# Patient Record
Sex: FEMALE | Race: OTHER | HISPANIC OR LATINO
[De-identification: names, ages, dates, MRNs, and addresses within clinical notes are randomized per-mention and may not be internally consistent; named-entity substitution may affect disease eponyms.]

---

## 2023-08-23 PROBLEM — Z00.00 ENCOUNTER FOR PREVENTIVE HEALTH EXAMINATION: Status: ACTIVE | Noted: 2023-08-23

## 2023-08-27 ENCOUNTER — NON-APPOINTMENT (OUTPATIENT)
Age: 18
End: 2023-08-27

## 2023-08-30 ENCOUNTER — APPOINTMENT (OUTPATIENT)
Dept: NEUROLOGY | Facility: CLINIC | Age: 18
End: 2023-08-30
Payer: MEDICAID

## 2023-08-30 ENCOUNTER — TRANSCRIPTION ENCOUNTER (OUTPATIENT)
Age: 18
End: 2023-08-30

## 2023-08-30 ENCOUNTER — NON-APPOINTMENT (OUTPATIENT)
Age: 18
End: 2023-08-30

## 2023-08-30 PROCEDURE — 95816 EEG AWAKE AND DROWSY: CPT

## 2024-02-25 ENCOUNTER — INPATIENT (INPATIENT)
Facility: HOSPITAL | Age: 19
LOS: 1 days | Discharge: HOME | DRG: 392 | End: 2024-02-27
Attending: STUDENT IN AN ORGANIZED HEALTH CARE EDUCATION/TRAINING PROGRAM | Admitting: INTERNAL MEDICINE
Payer: MEDICAID

## 2024-02-25 VITALS
WEIGHT: 134.92 LBS | RESPIRATION RATE: 18 BRPM | TEMPERATURE: 99 F | OXYGEN SATURATION: 99 % | DIASTOLIC BLOOD PRESSURE: 76 MMHG | SYSTOLIC BLOOD PRESSURE: 109 MMHG | HEART RATE: 142 BPM

## 2024-02-25 DIAGNOSIS — D64.9 ANEMIA, UNSPECIFIED: ICD-10-CM

## 2024-02-25 LAB
ALBUMIN SERPL ELPH-MCNC: 3.1 G/DL — LOW (ref 3.3–5)
ALP SERPL-CCNC: 53 U/L — SIGNIFICANT CHANGE UP (ref 40–120)
ALT FLD-CCNC: 27 U/L — SIGNIFICANT CHANGE UP (ref 12–78)
ANION GAP SERPL CALC-SCNC: 9 MMOL/L — SIGNIFICANT CHANGE UP (ref 5–17)
APPEARANCE UR: CLEAR — SIGNIFICANT CHANGE UP
APTT BLD: 29.5 SEC — SIGNIFICANT CHANGE UP (ref 24.5–35.6)
AST SERPL-CCNC: 11 U/L — LOW (ref 15–37)
BACTERIA # UR AUTO: ABNORMAL /HPF
BASOPHILS # BLD AUTO: 0.01 K/UL — SIGNIFICANT CHANGE UP (ref 0–0.2)
BASOPHILS # BLD AUTO: 0.01 K/UL — SIGNIFICANT CHANGE UP (ref 0–0.2)
BASOPHILS NFR BLD AUTO: 0.1 % — SIGNIFICANT CHANGE UP (ref 0–2)
BASOPHILS NFR BLD AUTO: 0.1 % — SIGNIFICANT CHANGE UP (ref 0–2)
BILIRUB SERPL-MCNC: 0.9 MG/DL — SIGNIFICANT CHANGE UP (ref 0.2–1.2)
BILIRUB UR-MCNC: NEGATIVE — SIGNIFICANT CHANGE UP
BUN SERPL-MCNC: 4 MG/DL — LOW (ref 7–23)
CALCIUM SERPL-MCNC: 8.6 MG/DL — SIGNIFICANT CHANGE UP (ref 8.5–10.1)
CHLORIDE SERPL-SCNC: 109 MMOL/L — HIGH (ref 96–108)
CO2 SERPL-SCNC: 23 MMOL/L — SIGNIFICANT CHANGE UP (ref 22–31)
COLOR SPEC: YELLOW — SIGNIFICANT CHANGE UP
CREAT SERPL-MCNC: 0.61 MG/DL — SIGNIFICANT CHANGE UP (ref 0.5–1.3)
D DIMER BLD IA.RAPID-MCNC: <150 NG/ML DDU — SIGNIFICANT CHANGE UP
DIFF PNL FLD: NEGATIVE — SIGNIFICANT CHANGE UP
EGFR: 131 ML/MIN/1.73M2 — SIGNIFICANT CHANGE UP
EOSINOPHIL # BLD AUTO: 0 K/UL — SIGNIFICANT CHANGE UP (ref 0–0.5)
EOSINOPHIL # BLD AUTO: 0 K/UL — SIGNIFICANT CHANGE UP (ref 0–0.5)
EOSINOPHIL NFR BLD AUTO: 0 % — SIGNIFICANT CHANGE UP (ref 0–6)
EOSINOPHIL NFR BLD AUTO: 0 % — SIGNIFICANT CHANGE UP (ref 0–6)
EPI CELLS # UR: PRESENT
GLUCOSE SERPL-MCNC: 93 MG/DL — SIGNIFICANT CHANGE UP (ref 70–99)
GLUCOSE UR QL: NEGATIVE MG/DL — SIGNIFICANT CHANGE UP
HCG SERPL-ACNC: <1 MIU/ML — SIGNIFICANT CHANGE UP
HCT VFR BLD CALC: 28.4 % — LOW (ref 34.5–45)
HCT VFR BLD CALC: 31.4 % — LOW (ref 34.5–45)
HGB BLD-MCNC: 10.5 G/DL — LOW (ref 11.5–15.5)
HGB BLD-MCNC: 9.4 G/DL — LOW (ref 11.5–15.5)
IMM GRANULOCYTES NFR BLD AUTO: 0.5 % — SIGNIFICANT CHANGE UP (ref 0–0.9)
IMM GRANULOCYTES NFR BLD AUTO: 0.6 % — SIGNIFICANT CHANGE UP (ref 0–0.9)
INR BLD: 1.11 RATIO — SIGNIFICANT CHANGE UP (ref 0.85–1.18)
KETONES UR-MCNC: NEGATIVE MG/DL — SIGNIFICANT CHANGE UP
LEUKOCYTE ESTERASE UR-ACNC: NEGATIVE — SIGNIFICANT CHANGE UP
LYMPHOCYTES # BLD AUTO: 1.01 K/UL — SIGNIFICANT CHANGE UP (ref 1–3.3)
LYMPHOCYTES # BLD AUTO: 1.03 K/UL — SIGNIFICANT CHANGE UP (ref 1–3.3)
LYMPHOCYTES # BLD AUTO: 11 % — LOW (ref 13–44)
LYMPHOCYTES # BLD AUTO: 9.6 % — LOW (ref 13–44)
MAGNESIUM SERPL-MCNC: 1.6 MG/DL — SIGNIFICANT CHANGE UP (ref 1.6–2.6)
MCHC RBC-ENTMCNC: 29.7 PG — SIGNIFICANT CHANGE UP (ref 27–34)
MCHC RBC-ENTMCNC: 29.9 PG — SIGNIFICANT CHANGE UP (ref 27–34)
MCHC RBC-ENTMCNC: 33.1 GM/DL — SIGNIFICANT CHANGE UP (ref 32–36)
MCHC RBC-ENTMCNC: 33.4 GM/DL — SIGNIFICANT CHANGE UP (ref 32–36)
MCV RBC AUTO: 89.5 FL — SIGNIFICANT CHANGE UP (ref 80–100)
MCV RBC AUTO: 89.6 FL — SIGNIFICANT CHANGE UP (ref 80–100)
MONOCYTES # BLD AUTO: 0.49 K/UL — SIGNIFICANT CHANGE UP (ref 0–0.9)
MONOCYTES # BLD AUTO: 0.6 K/UL — SIGNIFICANT CHANGE UP (ref 0–0.9)
MONOCYTES NFR BLD AUTO: 5.3 % — SIGNIFICANT CHANGE UP (ref 2–14)
MONOCYTES NFR BLD AUTO: 5.6 % — SIGNIFICANT CHANGE UP (ref 2–14)
NEUTROPHILS # BLD AUTO: 7.64 K/UL — HIGH (ref 1.8–7.4)
NEUTROPHILS # BLD AUTO: 9.04 K/UL — HIGH (ref 1.8–7.4)
NEUTROPHILS NFR BLD AUTO: 83.1 % — HIGH (ref 43–77)
NEUTROPHILS NFR BLD AUTO: 84.1 % — HIGH (ref 43–77)
NITRITE UR-MCNC: NEGATIVE — SIGNIFICANT CHANGE UP
NRBC # BLD: 0 /100 WBCS — SIGNIFICANT CHANGE UP (ref 0–0)
NRBC # BLD: 0 /100 WBCS — SIGNIFICANT CHANGE UP (ref 0–0)
OB PNL STL: NEGATIVE — SIGNIFICANT CHANGE UP
PCP SPEC-MCNC: SIGNIFICANT CHANGE UP
PH UR: 6.5 — SIGNIFICANT CHANGE UP (ref 5–8)
PLATELET # BLD AUTO: 218 K/UL — SIGNIFICANT CHANGE UP (ref 150–400)
PLATELET # BLD AUTO: 229 K/UL — SIGNIFICANT CHANGE UP (ref 150–400)
POTASSIUM SERPL-MCNC: 3.3 MMOL/L — LOW (ref 3.5–5.3)
POTASSIUM SERPL-SCNC: 3.3 MMOL/L — LOW (ref 3.5–5.3)
PROT SERPL-MCNC: 6.7 G/DL — SIGNIFICANT CHANGE UP (ref 6–8.3)
PROT UR-MCNC: NEGATIVE MG/DL — SIGNIFICANT CHANGE UP
PROTHROM AB SERPL-ACNC: 12.9 SEC — SIGNIFICANT CHANGE UP (ref 9.5–13)
RAPID RVP RESULT: SIGNIFICANT CHANGE UP
RBC # BLD: 3.17 M/UL — LOW (ref 3.8–5.2)
RBC # BLD: 3.51 M/UL — LOW (ref 3.8–5.2)
RBC # FLD: 13.2 % — SIGNIFICANT CHANGE UP (ref 10.3–14.5)
RBC # FLD: 13.3 % — SIGNIFICANT CHANGE UP (ref 10.3–14.5)
RBC CASTS # UR COMP ASSIST: 1 /HPF — SIGNIFICANT CHANGE UP (ref 0–4)
SARS-COV-2 RNA SPEC QL NAA+PROBE: SIGNIFICANT CHANGE UP
SODIUM SERPL-SCNC: 141 MMOL/L — SIGNIFICANT CHANGE UP (ref 135–145)
SP GR SPEC: 1.03 — HIGH (ref 1–1.03)
TSH SERPL-MCNC: 0.46 UIU/ML — SIGNIFICANT CHANGE UP (ref 0.36–3.74)
UROBILINOGEN FLD QL: 1 MG/DL — SIGNIFICANT CHANGE UP (ref 0.2–1)
WBC # BLD: 10.74 K/UL — HIGH (ref 3.8–10.5)
WBC # BLD: 9.2 K/UL — SIGNIFICANT CHANGE UP (ref 3.8–10.5)
WBC # FLD AUTO: 10.74 K/UL — HIGH (ref 3.8–10.5)
WBC # FLD AUTO: 9.2 K/UL — SIGNIFICANT CHANGE UP (ref 3.8–10.5)
WBC UR QL: 2 /HPF — SIGNIFICANT CHANGE UP (ref 0–5)

## 2024-02-25 PROCEDURE — 93010 ELECTROCARDIOGRAM REPORT: CPT

## 2024-02-25 PROCEDURE — 99222 1ST HOSP IP/OBS MODERATE 55: CPT

## 2024-02-25 PROCEDURE — 74177 CT ABD & PELVIS W/CONTRAST: CPT | Mod: 26,MC

## 2024-02-25 PROCEDURE — 99285 EMERGENCY DEPT VISIT HI MDM: CPT

## 2024-02-25 RX ORDER — ACETAMINOPHEN 500 MG
1000 TABLET ORAL ONCE
Refills: 0 | Status: COMPLETED | OUTPATIENT
Start: 2024-02-25 | End: 2024-02-25

## 2024-02-25 RX ORDER — POTASSIUM CHLORIDE 20 MEQ
10 PACKET (EA) ORAL
Refills: 0 | Status: COMPLETED | OUTPATIENT
Start: 2024-02-25 | End: 2024-02-25

## 2024-02-25 RX ORDER — ONDANSETRON 8 MG/1
4 TABLET, FILM COATED ORAL ONCE
Refills: 0 | Status: COMPLETED | OUTPATIENT
Start: 2024-02-25 | End: 2024-02-25

## 2024-02-25 RX ORDER — ONDANSETRON 8 MG/1
4 TABLET, FILM COATED ORAL EVERY 8 HOURS
Refills: 0 | Status: DISCONTINUED | OUTPATIENT
Start: 2024-02-25 | End: 2024-02-27

## 2024-02-25 RX ORDER — SODIUM CHLORIDE 9 MG/ML
1000 INJECTION INTRAMUSCULAR; INTRAVENOUS; SUBCUTANEOUS ONCE
Refills: 0 | Status: COMPLETED | OUTPATIENT
Start: 2024-02-25 | End: 2024-02-25

## 2024-02-25 RX ORDER — POTASSIUM CHLORIDE 20 MEQ
20 PACKET (EA) ORAL ONCE
Refills: 0 | Status: COMPLETED | OUTPATIENT
Start: 2024-02-25 | End: 2024-02-25

## 2024-02-25 RX ORDER — SODIUM CHLORIDE 9 MG/ML
1000 INJECTION INTRAMUSCULAR; INTRAVENOUS; SUBCUTANEOUS
Refills: 0 | Status: COMPLETED | OUTPATIENT
Start: 2024-02-25 | End: 2024-02-26

## 2024-02-25 RX ORDER — ACETAMINOPHEN 500 MG
650 TABLET ORAL EVERY 6 HOURS
Refills: 0 | Status: DISCONTINUED | OUTPATIENT
Start: 2024-02-25 | End: 2024-02-27

## 2024-02-25 RX ORDER — FAMOTIDINE 10 MG/ML
20 INJECTION INTRAVENOUS ONCE
Refills: 0 | Status: COMPLETED | OUTPATIENT
Start: 2024-02-25 | End: 2024-02-25

## 2024-02-25 RX ORDER — PANTOPRAZOLE SODIUM 20 MG/1
40 TABLET, DELAYED RELEASE ORAL DAILY
Refills: 0 | Status: DISCONTINUED | OUTPATIENT
Start: 2024-02-25 | End: 2024-02-27

## 2024-02-25 RX ADMIN — PANTOPRAZOLE SODIUM 40 MILLIGRAM(S): 20 TABLET, DELAYED RELEASE ORAL at 21:05

## 2024-02-25 RX ADMIN — Medication 650 MILLIGRAM(S): at 20:00

## 2024-02-25 RX ADMIN — Medication 400 MILLIGRAM(S): at 22:52

## 2024-02-25 RX ADMIN — ONDANSETRON 4 MILLIGRAM(S): 8 TABLET, FILM COATED ORAL at 22:52

## 2024-02-25 RX ADMIN — Medication 100 MILLIEQUIVALENT(S): at 23:23

## 2024-02-25 RX ADMIN — Medication 100 MILLIEQUIVALENT(S): at 23:47

## 2024-02-25 RX ADMIN — ONDANSETRON 4 MILLIGRAM(S): 8 TABLET, FILM COATED ORAL at 17:11

## 2024-02-25 RX ADMIN — FAMOTIDINE 20 MILLIGRAM(S): 10 INJECTION INTRAVENOUS at 17:11

## 2024-02-25 RX ADMIN — SODIUM CHLORIDE 1000 MILLILITER(S): 9 INJECTION INTRAMUSCULAR; INTRAVENOUS; SUBCUTANEOUS at 19:49

## 2024-02-25 RX ADMIN — SODIUM CHLORIDE 1000 MILLILITER(S): 9 INJECTION INTRAMUSCULAR; INTRAVENOUS; SUBCUTANEOUS at 17:11

## 2024-02-25 RX ADMIN — Medication 100 MILLIEQUIVALENT(S): at 22:17

## 2024-02-25 RX ADMIN — SODIUM CHLORIDE 75 MILLILITER(S): 9 INJECTION INTRAMUSCULAR; INTRAVENOUS; SUBCUTANEOUS at 21:07

## 2024-02-25 RX ADMIN — Medication 20 MILLIEQUIVALENT(S): at 22:52

## 2024-02-25 RX ADMIN — Medication 650 MILLIGRAM(S): at 21:05

## 2024-02-25 RX ADMIN — Medication 1000 MILLIGRAM(S): at 23:19

## 2024-02-25 NOTE — ED PROVIDER NOTE - OBJECTIVE STATEMENT
17 yo female with PMHX anemia, not on medication, present to ED c/o abdominal pain, diarrhea, nausea, vomiting x 3-4 episodes (most recently prior to arrival). Per patient has had several pre-syncopal episodes over past two days. Mother has witness and patient became pale, felt weak and had muffled hearing during the episode. Patient is not currently menstruating, no sick contacts. + bloating, + chills.   Denies HA, fever, or vision change. Denies ear pain. Denies throat pain. Denies CP, palpitations, SOB or cough. ++ upper and lower abd pain, ++  n/v/d. Denies urinary symptoms, dysuria, frequency or urgency. 17 yo female with PMHX anemia, not on medication, present to ED c/o abdominal pain, diarrhea, nausea, vomiting x 3-4 episodes (most recently prior to arrival). Per patient has had several pre-syncopal episodes over past two days. Mother has witness and patient became pale, felt weak and had muffled hearing during the episode. Patient is not currently menstruating, no sick contacts. + bloating, + chills.   Denies HA, fever, or vision change. Denies ear pain. Denies throat pain. Denies CP, palpitations, SOB or cough. ++ upper and lower abd pain, ++  n/v/d. Denies urinary symptoms, dysuria, frequency or urgency. PCP Dominguez Odom.

## 2024-02-25 NOTE — ED ADULT NURSE NOTE - SUICIDE SCREENING DEPRESSION
Arterial Line  Performed by: Nathaly Fuentes M.D.  Authorized by: Nathaly Fuentes M.D.                Negative

## 2024-02-25 NOTE — ED PROVIDER NOTE - RESPIRATORY, MLM
Detail Level: Zone Patient had complaints of cysts in her groin area that have been pussing and draining. Patient states that she has been having trouble finding a gynecologist and would like a recommendation. Breath sounds clear and equal bilaterally.

## 2024-02-25 NOTE — H&P ADULT - NSHPPHYSICALEXAM_GEN_ALL_CORE
T(C): 37.1 (02-25-24 @ 15:48), Max: 37.1 (02-25-24 @ 15:48)  HR: 142 (02-25-24 @ 15:48) (142 - 142)  BP: 109/76 (02-25-24 @ 15:48) (109/76 - 109/76)  RR: 18 (02-25-24 @ 15:48) (18 - 18)  SpO2: 99% (02-25-24 @ 15:48) (99% - 99%)  Wt(kg): --    Physical Exam:   GENERAL: well-groomed, well-developed, NAD  HEENT: head NC/AT;  conjunctiva & sclera clear; hearing grossly intact, dry mucous membranes  NECK: supple, no JVD  RESPIRATORY: CTA B/L, no wheezing, rales, rhonchi or rubs  CARDIOVASCULAR: S1&S2, tachy, no murmurs or gallops  ABDOMEN: soft, non-tender, non-distended, + Bowel sounds x4 quadrants, no guarding, rebound or rigidity  MUSCULOSKELETAL:  no clubbing, cyanosis or edema of all 4 extremities  LYMPH: no cervical lymphadenopathy  VASCULAR: Radial pulses 2+ bilaterally, no varicose veins   SKIN: warm and dry, color normal  NEUROLOGIC: AA&O X3,MAEx4  Psych: Normal mood and affect, normal behavior

## 2024-02-25 NOTE — ED PROVIDER NOTE - CLINICAL SUMMARY MEDICAL DECISION MAKING FREE TEXT BOX
Patient is a 18-year-old female presents to the emergency room with a chief complaint of upper abdominal discomfort.  Past medical history of anemia not on any medication.  Reports that symptoms began yesterday she developed abdominal pain worse in the upper abdomen with associated nausea and 3-4 episodes of vomiting and multiple episodes of nonbloody diarrhea.  Also reports that she has had several presyncopal episodes over the last several days.  Prior to the episode she will become pale weak and her hearing becomes muffled.  Denies noting any black or bloody stool.  Is not currently menstruating.  Is reporting some chills and myalgias.  No headache at this time.  Denies any chest pain or shortness of breath feels like it is difficult to take a deep breath due to the upper abdominal discomfort.  On exam patient is lying in bed no acute distress normocephalic atraumatic pupils are equal round and reactive there is dry mucous membranes heart is regular with rapid rate lungs are clear to auscultation diminished at the bases abdomen soft with tenderness palpation the epigastric left upper quadrant left lower quadrant.  Positive bowel sounds noted.  Patient presenting to the emergency room with abdominal discomfort nausea vomiting diarrhea.  Differential includes but not limited to possible viral etiology versus colitis versus diverticulitis versus additional GI pathology.  Will obtain screening labs check D-dimer EKG hydrate obtain UA urine culture obtain CT imaging of the abdomen pelvis and monitor.  Independent review of EKG reveals a sinus tachycardia at 129 bpm.  CT imaging with no acute pathology.  Patient remains tachycardic with a slight downtrend in hemoglobin although guaiac is negative.  Urine drug screen and thyroid studies added will supplement potassium.  At this time will admit for further workup and management.  Pelvic ultrasound and venous Dopplers of the lower extremities were ordered as well medicine team to follow these up.

## 2024-02-25 NOTE — H&P ADULT - HISTORY OF PRESENT ILLNESS
Pt is a 19 yo F presenting with near syncope found to have anemia. PMh ADHD on Ritalin, anemia.     Patient states that since yesterday she has been having diarrhea with 2 episodes yesterday and 2 episodes today. Non bloody. She also has nausea and vomiting about 3 times without any blood. Subsequently today she felt light-headed and last night she felt like passing out but did not have LOC.   Her menses lasts 2-3 days and she only soaks through 3 tampons each day approx. She donated blood last week. Her abdominal pain is b/l lower quadrants.   Denies headaches,chest pain, SOB, palpitations,  constipation, diarrhea, melena, hematochezia, dysuria.   No recent travel or abx use.   Reports hx of anemia but does not know her baseline Hgb.

## 2024-02-25 NOTE — ED PROVIDER NOTE - ENMT NEGATIVE STATEMENT, MLM
Ears: no ear pain and++ muffles hearing during episode of pre-syncope. Nose: no nasal congestion and no nasal drainage. Mouth/Throat: no dysphagia, no hoarseness and no throat pain. Neck: no lumps, no pain, no stiffness and no swollen glands.

## 2024-02-25 NOTE — ED PROVIDER NOTE - PROGRESS NOTE DETAILS
pt continue to be tachycardic, rpt labs show decrease in H/H--will admit for symptomatic anemia and potential blood transfusion. Guiac pending.

## 2024-02-25 NOTE — ED ADULT NURSE NOTE - OBJECTIVE STATEMENT
pt sent from  for further evaluation. A&Ox4. c/o diffuse abd pain, dizziness. pt had 2 episodes diarrhea and 3 episodes emesis last night. today pt continues to feel nausea and dizziness. denies chest pain, sob, distress. denies fevers. no cardiac history. pt ekg completed. placed on monitor.

## 2024-02-25 NOTE — ED PROVIDER NOTE - ENMT, MLM
Airway patent, Nasal mucosa clear. Mouth with normal mucosa. Throat has no vesicles, no oropharyngeal exudates and uvula is midline. Pale mucous membranes.

## 2024-02-25 NOTE — H&P ADULT - ASSESSMENT
Pt is a 19 yo F presenting with near syncope found to have anemia. PMh ADHD on Ritalin, anemia.     Anemia: etiology unclear  -f/u pelvic ultrasound  -CT abd reviewed  -trend H/h  -FOBT negative, will repeat FOBT  -start IV protonix 40mg daily  -GI consulted  Dr Smith    Sinus tachycardia:likely due to dehydration and anemia  -continue IVF  -cardio consult Alvino Galvez  -f/u Utox  -replete potassium and recheck electrolytes    ADHD: can resume ritalin on discharge    Birth control mgmt: takes Junel. Patients family will bring and will be given to pharmacy for verification    DVT ppx: SCD's

## 2024-02-25 NOTE — ED ADULT NURSE NOTE - NSFALLUNIVINTERV_ED_ALL_ED
Bed/Stretcher in lowest position, wheels locked, appropriate side rails in place/Call bell, personal items and telephone in reach/Instruct patient to call for assistance before getting out of bed/chair/stretcher/Non-slip footwear applied when patient is off stretcher/[place] to call system/Physically safe environment - no spills, clutter or unnecessary equipment/Purposeful proactive rounding/Room/bathroom lighting operational, light cord in reach

## 2024-02-25 NOTE — ED PROVIDER NOTE - NEURO NEGATIVE STATEMENT, MLM
no loss of consciousness, ++ Pre-syncope ,  no gait abnormality, no headache, no sensory deficits, and ++weakness.

## 2024-02-25 NOTE — ED PROVIDER NOTE - WHICH SHOWED
Independent review of EKG reveals a sinus tachycardia at 129 bpm.  CT imaging with no acute pathology.

## 2024-02-26 ENCOUNTER — TRANSCRIPTION ENCOUNTER (OUTPATIENT)
Age: 19
End: 2024-02-26

## 2024-02-26 LAB
ALBUMIN SERPL ELPH-MCNC: 2.5 G/DL — LOW (ref 3.3–5)
ALP SERPL-CCNC: 49 U/L — SIGNIFICANT CHANGE UP (ref 40–120)
ALT FLD-CCNC: 56 U/L — SIGNIFICANT CHANGE UP (ref 12–78)
ANION GAP SERPL CALC-SCNC: 4 MMOL/L — LOW (ref 5–17)
AST SERPL-CCNC: 29 U/L — SIGNIFICANT CHANGE UP (ref 15–37)
BASOPHILS # BLD AUTO: 0.01 K/UL — SIGNIFICANT CHANGE UP (ref 0–0.2)
BASOPHILS NFR BLD AUTO: 0.2 % — SIGNIFICANT CHANGE UP (ref 0–2)
BILIRUB SERPL-MCNC: 0.4 MG/DL — SIGNIFICANT CHANGE UP (ref 0.2–1.2)
BUN SERPL-MCNC: 3 MG/DL — LOW (ref 7–23)
CALCIUM SERPL-MCNC: 8 MG/DL — LOW (ref 8.5–10.1)
CHLORIDE SERPL-SCNC: 120 MMOL/L — HIGH (ref 96–108)
CO2 SERPL-SCNC: 24 MMOL/L — SIGNIFICANT CHANGE UP (ref 22–31)
CREAT SERPL-MCNC: 0.45 MG/DL — LOW (ref 0.5–1.3)
CULTURE RESULTS: SIGNIFICANT CHANGE UP
EGFR: 141 ML/MIN/1.73M2 — SIGNIFICANT CHANGE UP
EOSINOPHIL # BLD AUTO: 0.05 K/UL — SIGNIFICANT CHANGE UP (ref 0–0.5)
EOSINOPHIL NFR BLD AUTO: 0.9 % — SIGNIFICANT CHANGE UP (ref 0–6)
GLUCOSE SERPL-MCNC: 83 MG/DL — SIGNIFICANT CHANGE UP (ref 70–99)
HCT VFR BLD CALC: 25.4 % — LOW (ref 34.5–45)
HCT VFR BLD CALC: 29.2 % — LOW (ref 34.5–45)
HGB BLD-MCNC: 8.5 G/DL — LOW (ref 11.5–15.5)
HGB BLD-MCNC: 9.5 G/DL — LOW (ref 11.5–15.5)
IMM GRANULOCYTES NFR BLD AUTO: 0.5 % — SIGNIFICANT CHANGE UP (ref 0–0.9)
LYMPHOCYTES # BLD AUTO: 1.18 K/UL — SIGNIFICANT CHANGE UP (ref 1–3.3)
LYMPHOCYTES # BLD AUTO: 20.8 % — SIGNIFICANT CHANGE UP (ref 13–44)
MAGNESIUM SERPL-MCNC: 1.4 MG/DL — LOW (ref 1.6–2.6)
MAGNESIUM SERPL-MCNC: 2.1 MG/DL — SIGNIFICANT CHANGE UP (ref 1.6–2.6)
MCHC RBC-ENTMCNC: 30 PG — SIGNIFICANT CHANGE UP (ref 27–34)
MCHC RBC-ENTMCNC: 32.5 GM/DL — SIGNIFICANT CHANGE UP (ref 32–36)
MCV RBC AUTO: 92.1 FL — SIGNIFICANT CHANGE UP (ref 80–100)
MONOCYTES # BLD AUTO: 0.66 K/UL — SIGNIFICANT CHANGE UP (ref 0–0.9)
MONOCYTES NFR BLD AUTO: 11.6 % — SIGNIFICANT CHANGE UP (ref 2–14)
NEUTROPHILS # BLD AUTO: 3.75 K/UL — SIGNIFICANT CHANGE UP (ref 1.8–7.4)
NEUTROPHILS NFR BLD AUTO: 66 % — SIGNIFICANT CHANGE UP (ref 43–77)
NRBC # BLD: 0 /100 WBCS — SIGNIFICANT CHANGE UP (ref 0–0)
OB PNL STL: NEGATIVE — SIGNIFICANT CHANGE UP
PLATELET # BLD AUTO: 204 K/UL — SIGNIFICANT CHANGE UP (ref 150–400)
POTASSIUM SERPL-MCNC: 3.5 MMOL/L — SIGNIFICANT CHANGE UP (ref 3.5–5.3)
POTASSIUM SERPL-MCNC: 3.8 MMOL/L — SIGNIFICANT CHANGE UP (ref 3.5–5.3)
POTASSIUM SERPL-SCNC: 3.5 MMOL/L — SIGNIFICANT CHANGE UP (ref 3.5–5.3)
POTASSIUM SERPL-SCNC: 3.8 MMOL/L — SIGNIFICANT CHANGE UP (ref 3.5–5.3)
PROT SERPL-MCNC: 5.7 G/DL — LOW (ref 6–8.3)
RBC # BLD: 3.17 M/UL — LOW (ref 3.8–5.2)
RBC # FLD: 13.6 % — SIGNIFICANT CHANGE UP (ref 10.3–14.5)
SODIUM SERPL-SCNC: 148 MMOL/L — HIGH (ref 135–145)
SPECIMEN SOURCE: SIGNIFICANT CHANGE UP
WBC # BLD: 5.68 K/UL — SIGNIFICANT CHANGE UP (ref 3.8–10.5)
WBC # FLD AUTO: 5.68 K/UL — SIGNIFICANT CHANGE UP (ref 3.8–10.5)

## 2024-02-26 PROCEDURE — 93970 EXTREMITY STUDY: CPT | Mod: 26

## 2024-02-26 PROCEDURE — 76830 TRANSVAGINAL US NON-OB: CPT | Mod: 26

## 2024-02-26 PROCEDURE — 70450 CT HEAD/BRAIN W/O DYE: CPT | Mod: 26

## 2024-02-26 PROCEDURE — 99232 SBSQ HOSP IP/OBS MODERATE 35: CPT

## 2024-02-26 RX ORDER — SODIUM CHLORIDE 9 MG/ML
1000 INJECTION, SOLUTION INTRAVENOUS
Refills: 0 | Status: DISCONTINUED | OUTPATIENT
Start: 2024-02-26 | End: 2024-02-27

## 2024-02-26 RX ORDER — MAGNESIUM SULFATE 500 MG/ML
1 VIAL (ML) INJECTION ONCE
Refills: 0 | Status: COMPLETED | OUTPATIENT
Start: 2024-02-26 | End: 2024-02-26

## 2024-02-26 RX ORDER — POTASSIUM CHLORIDE 20 MEQ
40 PACKET (EA) ORAL EVERY 4 HOURS
Refills: 0 | Status: COMPLETED | OUTPATIENT
Start: 2024-02-26 | End: 2024-02-26

## 2024-02-26 RX ADMIN — SODIUM CHLORIDE 75 MILLILITER(S): 9 INJECTION INTRAMUSCULAR; INTRAVENOUS; SUBCUTANEOUS at 06:37

## 2024-02-26 RX ADMIN — Medication 100 GRAM(S): at 06:33

## 2024-02-26 RX ADMIN — Medication 40 MILLIEQUIVALENT(S): at 07:06

## 2024-02-26 RX ADMIN — PANTOPRAZOLE SODIUM 40 MILLIGRAM(S): 20 TABLET, DELAYED RELEASE ORAL at 12:48

## 2024-02-26 RX ADMIN — Medication 650 MILLIGRAM(S): at 19:58

## 2024-02-26 RX ADMIN — Medication 650 MILLIGRAM(S): at 09:22

## 2024-02-26 RX ADMIN — Medication 650 MILLIGRAM(S): at 19:09

## 2024-02-26 RX ADMIN — SODIUM CHLORIDE 75 MILLILITER(S): 9 INJECTION, SOLUTION INTRAVENOUS at 12:48

## 2024-02-26 NOTE — PROVIDER CONTACT NOTE (OTHER) - ASSESSMENT
tachypnea, able to follow commands, neuro exam noted with leg tremors ; vital signs 120/76, , SPO2 98 on room air. Sinus tachy on telemetry monitor.

## 2024-02-26 NOTE — DISCHARGE NOTE PROVIDER - CARE PROVIDER_API CALL
PCP,   Phone: (   )    -  Fax: (   )    -  Follow Up Time:    PCP,   Phone: (   )    -  Fax: (   )    -  Follow Up Time:     Speedy Smith  Gastroenterology  [address] [place] [id number]  Phone: [phone]  Fax: [phone]  Follow Up Time: 2 weeks

## 2024-02-26 NOTE — CARE COORDINATION ASSESSMENT. - NSCAREPROVIDERS_GEN_ALL_CORE_FT
CARE PROVIDERS:  Accepting Physician: Paulino Pulido  Access Services: Finesse Arcos  Administration: Maged Juarez  Administration: Jt Oswald  Administration: Federica Spencer  Admitting: Paulino Pulido  Attending: Paulino Pulido  Consultant: Alvino Galvez  Consultant: Speedy Smith  ED ACP: Sherlyn Gary  ED Attending: Anette Garg  ED Nurse: Katalina Roblero  Nurse: Gloria Workman  Nurse: Najma Calle  Ordered: ADM, User  Ordered: Doctor, Unknown  Override: Johanna Hardy  Primary Team: Dorina Lombardo  Primary Team: Noris Funk  Primary Team: Renato Soto  Respiratory Therapy: Bhanu Hernadez  : Amanda Wallace  : Matthew Zendejas  Team: [facility], Team  UR// Supp. Assoc.: Sandra Umana

## 2024-02-26 NOTE — PATIENT PROFILE ADULT - FALL HARM RISK - HARM RISK INTERVENTIONS
Problem: Goal Outcome Summary  Goal: Goal Outcome Summary  Outcome: Completed Date Met:  04/27/17  Dr. Batista was aware of morning chest xray. Patient with no shortness of breath. Remains of room air. Ambulated in hallway twice this shift. Patient requested pain medication for back ache after activity and headache. Patient requested Oxycodone. Decline Tylenol. Patient states total relief of pain.       Communicate Risk of Fall with Harm to all staff/Reinforce activity limits and safety measures with patient and family/Tailored Fall Risk Interventions/Visual Cue: Yellow wristband and red socks/Bed in lowest position, wheels locked, appropriate side rails in place/Call bell, personal items and telephone in reach/Instruct patient to call for assistance before getting out of bed or chair/Non-slip footwear when patient is out of bed/[place] to call system/Physically safe environment - no spills, clutter or unnecessary equipment/Purposeful Proactive Rounding/Room/bathroom lighting operational, light cord in reach

## 2024-02-26 NOTE — CARE COORDINATION ASSESSMENT. - OTHER PERTINENT DISCHARGE PLANNING INFORMATION:
CM met with the patient at the bedside, educated pt on role of CM and transition planning. Patient verbalized understanding. CM provided direct contact/resource folder and remains available. Pt resides in a house with her parents and siblings, has 6 steps to enter and a flight to upstairs. Pt independent with ADLS and ambulating. Denies any DME or prior home care services. Pharmacy [address]

## 2024-02-26 NOTE — DISCHARGE NOTE PROVIDER - NSDCMRMEDTOKEN_GEN_ALL_CORE_FT
methylphenidate 10 mg oral tablet: 1 tab(s) orally once a day with 5 mg tablet (15 mg TDD)  methylphenidate 5 mg oral tablet: 1 tab(s) orally once a day with 10 mg tablet (15 mg TDD)  norethindrone-ethinyl estradiol 1.5 mg-30 mcg oral tablet: 1 tab(s) orally once a day   acetaminophen 325 mg oral tablet: 2 tab(s) orally every 6 hours As needed Temp greater or equal to 38C (100.4F), Mild Pain (1 - 3)  methylphenidate 10 mg oral tablet: 1 tab(s) orally once a day with 5 mg tablet (15 mg TDD)  methylphenidate 5 mg oral tablet: 1 tab(s) orally once a day with 10 mg tablet (15 mg TDD)  norethindrone-ethinyl estradiol 1.5 mg-30 mcg oral tablet: 1 tab(s) orally once a day

## 2024-02-26 NOTE — DISCHARGE NOTE PROVIDER - NSDCCPCAREPLAN_GEN_ALL_CORE_FT
PRINCIPAL DISCHARGE DIAGNOSIS  Diagnosis: Abdominal pain  Assessment and Plan of Treatment:       SECONDARY DISCHARGE DIAGNOSES  Diagnosis: Anemia  Assessment and Plan of Treatment: followup with your PCP for the workup of the anemia.     PRINCIPAL DISCHARGE DIAGNOSIS  Diagnosis: Abdominal pain  Assessment and Plan of Treatment: You were found to have norovirus infection.  Clinically you have improved with resolution of diarrhea and abdominal pain.  Please maintain adequate oral hydration.  Follow up with your PMD in 1 week and GI in 1-2 weeks.      SECONDARY DISCHARGE DIAGNOSES  Diagnosis: Anemia  Assessment and Plan of Treatment: followup with your PCP for the workup of the anemia.

## 2024-02-26 NOTE — DISCHARGE NOTE PROVIDER - PROVIDER TOKENS
FREE:[LAST:[PCP],PHONE:[(   )    -],FAX:[(   )    -]] FREE:[LAST:[PCP],PHONE:[(   )    -],FAX:[(   )    -]],PROVIDER:[TOKEN:[[id number]],FOLLOWUP:[2 weeks]]

## 2024-02-26 NOTE — DISCHARGE NOTE PROVIDER - ATTENDING DISCHARGE PHYSICAL EXAMINATION:
VITALS:   T(C): 36.5 (02-26-24 @ 09:47), Max: 37.1 (02-25-24 @ 15:48)  HR: 108 (02-26-24 @ 09:47) (102 - 142)  BP: 113/78 (02-26-24 @ 09:47) (96/53 - 113/78)  RR: 22 (02-26-24 @ 09:47) (18 - 34)  SpO2: 95% (02-26-24 @ 09:47) (95% - 99%)    GENERAL: NAD, lying in bed comfortably  HEAD:  Atraumatic, Normocephalic  EYES: EOMI, PERRLA, conjunctiva and sclera clear  ENT: Moist mucous membranes  NECK: Supple, No JVD  CHEST/LUNG: Clear to auscultation bilaterally; No rales, rhonchi, wheezing, or rubs. Unlabored respirations  HEART: Regular rate and rhythm; No murmurs, rubs, or gallops  ABDOMEN: BSx4; Soft, mild lower abd discomfort  EXTREMITIES:  2+ Peripheral Pulses, brisk capillary refill. No clubbing, cyanosis, or edema  NERVOUS SYSTEM:  A&Ox3, no focal deficits   SKIN: No rashes or lesions  PSYCH: Normal affect, euthymic mood VITALS: T: 97.9  P: 100  BP: 103/71  RR: 21  SpO2: 98% RA    GENERAL: NAD, lying in bed comfortably  HEAD:  Atraumatic, Normocephalic  EYES: EOMI, PERRLA, conjunctiva and sclera clear  ENT: Moist mucous membranes  NECK: Supple, No JVD  CHEST/LUNG: Clear to auscultation bilaterally; No rales, rhonchi, wheezing, or rubs. Unlabored respirations  HEART: Regular rate and rhythm; No murmurs, rubs, or gallops  ABDOMEN: BSx4; Soft, NT/ND  EXTREMITIES:  2+ Peripheral Pulses, brisk capillary refill. No clubbing, cyanosis, or edema  NERVOUS SYSTEM:  A&Ox3, no focal deficits   SKIN: No rashes or lesions  PSYCH: Normal affect, euthymic mood

## 2024-02-26 NOTE — ED ADULT NURSE REASSESSMENT NOTE - NS ED NURSE REASSESS COMMENT FT1
Pt received from night Rn. Pt A&Ox4 on rom air. Pt c/o migraine and moderate abdominal pain. Pt resting in stretcher.
pt home medication sent to pharmacy
Patient received from [facility].  Patient is presently receiving Potassium IV.

## 2024-02-26 NOTE — DISCHARGE NOTE PROVIDER - HOSPITAL COURSE
19yo F ADHD on Ritalin, anemia p/w diarrhea with 2 episodes yesterday and 2 episodes, noted some dizziness. Admitted for further care. Reports diarrhea resolved since admission and abd improved. Able to tolerate diet. Also noted with anemia, pt reports that she knows about the anemia diagnosis x 1 month. FOBT negative and denies heavy menses. Hg remains stable, okay for dc. 19yo F ADHD on Ritalin, anemia p/w diarrhea with 2 episodes yesterday and 2 episodes, noted some dizziness. Admitted for further care. Reports diarrhea resolved since admission and abd improved. Able to tolerate diet. Also noted with anemia, pt reports that she knows about the anemia diagnosis x 1 month. FOBT negative and denies heavy menses. Hg remains stable, stable for dc. Will need outpatient workup for anemia. 19yo F ADHD on Ritalin, anemia p/w diarrhea with 2 episodes yesterday and 2 episodes, noted some dizziness. Admitted for further care. Reports diarrhea resolved since admission and abd improved. Able to tolerate diet. Also noted with anemia, pt reports that she knows about the anemia diagnosis x 1 month. FOBT negative x 2 and denies heavy menses. Hg remains stable.  Will need outpatient workup for anemia.  Pt. had GI PCR positive for norovirus.  Clinically patient has improved.  Diarrhea resolved and patient without any abdominal pain.  Tolerating regular diet.  Transvaginal US with no evidence of ovarian torsion.  LE dopplers negative for DVT.      On day of discharge patient is in no distress.  Afebrile and hemodynamically stable.

## 2024-02-26 NOTE — CONSULT NOTE ADULT - ASSESSMENT
abdominal pain  nausea/vomiting  diarrhea    cont reg diet  suspected acute gastroenteritis  check GI pcr  observe off antibiotics  outpatient anemia workup  d/w patient
The patient is a 18 year old female with a history of ADHD who presents with abdominal pain.    Plan:  - ECG with sinus tachycardia  - Telemetry reviewed; there is sinus tachycardia but no evidence of other tachyarrhythmias  - HR had improved to low 100s  - Tachycardia likely multifactorial from pain, dehydration, anemia, anxiety  - Monitor hemoglobin - in part may be dilutional  - Abdominal imaging unrevealing  - No further cardiac testing indicated at this time

## 2024-02-26 NOTE — PHARMACOTHERAPY INTERVENTION NOTE - COMMENTS
Search Terms: Ivet Tadeo, 2005Search Date: 02/26/2024 10:20:35 AM  The Drug Utilization Report below displays all of the controlled substance prescriptions, if any, that your patient has filled in the last twelve months. The information displayed on this report is compiled from pharmacy submissions to the Department, and accurately reflects the information as submitted by the pharmacies.    This report was requested by: Melanie Jaime | Reference #: [id number]    Practitioner Count: 1  Pharmacy Count: 1  Current Opioid Prescriptions: 0  Current Benzodiazepine Prescriptions: 0  Current Stimulant Prescriptions: 2      Patient Demographic Information (PDI)       PDI	First Name	Last Name	Birth Date	Gender	Street Address	[facility] Code  A	Ivet Tadeo	2005	[facility]	[address] [facility]	[id number]    Prescription Information      PDI Filter:    PDI	Current Rx	Drug Type	Rx Written	Rx Dispensed	Drug	Quantity	Days Supply	Prescriber Name	Prescriber CRYSTAL #	Payment Method	Dispenser  A	Y	S	02/14/2024	02/14/2024	methylphenidate 5 mg tablet	30	30	William Kennedy MD	[id number]	Medicaid	[id number]  A	Y	S	02/14/2024	02/14/2024	methylphenidate 10 mg tablet	30	30	William Kennedy MD	[id number]	Medicaid	[id number]  A	N	S	11/21/2023	11/21/2023	methylphenidate 5 mg tablet	30	30	William Kennedy MD	[id number]	Medicaid	[id number]  A	N	S	11/21/2023	11/21/2023	methylphenidate 10 mg tablet	30	30	William Kennedy MD	[id number]	Medicaid	[id number]  A	N	S	09/15/2023	09/15/2023	methylphenidate 10 mg tablet	30	30	William Kennedy MD	[id number]	Medicaid	[id number]  A	N	S	08/23/2023	08/23/2023	vyvanse 20 mg capsule	30	30	William Kennedy MD	[id number]	Medicaid	[id number]    * - Details of Drug Type : O = Opioid, B = Benzodiazepine, S = Stimulant    * - Drugs marked with an asterisk are compound drugs. If the compound drug is made up of more than one controlled substance, then each controlled substance will be a separate row in the table.

## 2024-02-26 NOTE — PROGRESS NOTE ADULT - ASSESSMENT
Pt is a 19 yo F presenting with near syncope found to have anemia. PMh ADHD on Ritalin, anemia.     Anemia: etiology unclear  -pelvic ultrasound neg  -CT abd reviewed  -trend H/h  -FOBT negative    Sinus tachycardia:likely due to dehydration and anemia  -continue IVF  -cardio consult Alvino Galvez  -replete potassium and recheck electrolytes    ADHD: can resume ritalin on discharge    Birth control mgmt: takes Junel. Patients family will bring and will be given to pharmacy for verification    DVT ppx: SCD's Pt is a 19 yo F presenting with near syncope found to have anemia. PMh ADHD on Ritalin, anemia.     Anemia: reports chronic, was dx with it over a month ago  -pelvic ultrasound neg  -CT abd reviewed  -trend H/h  -FOBT negative  -f/u pcp outpatient for anemia workup    Sinus tachycardia: likely due to dehydration and anemia  -continue IVF  -cardio consult Alvino Galvez  -replete potassium and recheck electrolytes    ADHD: can resume ritalin on discharge    Birth control mgmt: takes Junel. Patients family will bring and will be given to pharmacy for verification    DVT ppx: SCD's

## 2024-02-26 NOTE — CONSULT NOTE ADULT - SUBJECTIVE AND OBJECTIVE BOX
History of Present Illness: The patient is a 18 year old female with a history of ADHD who presents with abdominal pain. She states she developed significant upper abdominal pain yesterday. There was associated nausea and vomiting. No chest pain, shortness of breath, palpitations.    Past Medical/Surgical History:  ADHD    Medications:  Home Medications:  Junel 1.5/30 oral tablet: 1 tab(s) orally once a day (25 Feb 2024 19:43)      Family History: Non-contributory family history of premature cardiovascular atherosclerotic disease    Social History: No tobacco, alcohol or drug use    Review of Systems:  General: No fevers, chills, weight gain  Skin: No rashes, color changes  Cardiovascular: No chest pain, orthopnea  Respiratory: No shortness of breath, cough  Gastrointestinal: No nausea, abdominal pain  Genitourinary: No incontinence, pain with urination  Musculoskeletal: No pain, swelling, decreased range of motion  Neurological: No headache, weakness  Psychiatric: No depression, anxiety  Endocrine: No weight gain, increased thirst  All other systems are comprehensively negative.    Physical Exam:  Vitals:        Vital Signs Last 24 Hrs  T(C): 36.7 (26 Feb 2024 04:37), Max: 37.1 (25 Feb 2024 15:48)  T(F): 98.1 (26 Feb 2024 04:37), Max: 98.8 (25 Feb 2024 15:48)  HR: 102 (26 Feb 2024 04:37) (102 - 142)  BP: 103/66 (26 Feb 2024 04:37) (96/53 - 109/76)  BP(mean): --  RR: 21 (26 Feb 2024 04:37) (18 - 34)  SpO2: 95% (26 Feb 2024 04:37) (95% - 99%)    Parameters below as of 26 Feb 2024 04:37  Patient On (Oxygen Delivery Method): room air  General: NAD  HEENT: MMM  Neck: No JVD, no carotid bruit  Lungs: CTAB  CV: RRR, nl S1/S2, no M/R/G  Abdomen: S/NT/ND, +BS  Extremities: No LE edema, no cyanosis  Neuro: AAOx3, non-focal  Skin: No rash    Labs:                        8.5    x     )-----------( x        ( 26 Feb 2024 00:02 )             25.4     02-26    x   |  x   |  x   ----------------------------<  x   3.5   |  x   |  x     Ca    8.6      25 Feb 2024 17:20  Mg     1.4     02-26    TPro  6.7  /  Alb  3.1<L>  /  TBili  0.9  /  DBili  x   /  AST  11<L>  /  ALT  27  /  AlkPhos  53  02-25        PT/INR - ( 25 Feb 2024 17:20 )   PT: 12.9 sec;   INR: 1.11 ratio         PTT - ( 25 Feb 2024 17:20 )  PTT:29.5 sec    ECG/Telemetry: Sinus tachycardia, normal axis, no ST abnormality    
[facility]  Gorge Funk PA-C  [address] [place] [address]  [phone]      Chief Complaint:  Patient is a 18y old  Female who presents with a chief complaint of Anemia (26 Feb 2024 11:53)       The patient is a 18 year old female with a history of ADHD who presents with abdominal pain. She states she developed significant upper abdominal pain yesterday. There was associated nausea and vomiting. No chest pain, shortness of breath, palpitations.  fobt negative  CT shows no bowel pathology  utox negative for thc  feels better     Allergies:  No Known Drug Allergies  latex (Rash)      Medications:  acetaminophen     Tablet .. 650 milliGRAM(s) Oral every 6 hours PRN  lactated ringers. 1000 milliLiter(s) IV Continuous <Continuous>  ondansetron Injectable 4 milliGRAM(s) IV Push every 8 hours PRN  pantoprazole  Injectable 40 milliGRAM(s) IV Push daily      PMHX/PSHX:  No significant past surgical history        Family history:  No pertinent family history in first degree relatives        Social History:     ROS:     General:  no fevers, chills, night sweats, fatigue,   Eyes:  Good vision, no reported pain  ENT:  No sore throat, pain, runny nose, dysphagia  CV:  No pain, palpitations, hypo/hypertension  Resp:  No dyspnea, cough, tachypnea, wheezing  GI:  No pain, No nausea, No vomiting, No diarrhea, No constipation, No weight loss, No fever, No pruritis, No rectal bleeding, No tarry stools, No dysphagia,  :  No pain, bleeding, incontinence, nocturia  Muscle:  No pain, weakness  Neuro:  No weakness, tingling, memory problems  Psych:  No fatigue, insomnia, mood problems, depression  Endocrine:  No polyuria, polydipsia, cold/heat intolerance  Heme:  No petechiae, ecchymosis, easy bruisability  Skin:  No rash, tattoos, scars, edema      PHYSICAL EXAM:   Vital Signs:  Vital Signs Last 24 Hrs  T(C): 36.5 (26 Feb 2024 09:47), Max: 37.1 (25 Feb 2024 15:48)  T(F): 97.7 (26 Feb 2024 09:47), Max: 98.8 (25 Feb 2024 15:48)  HR: 106 (26 Feb 2024 11:53) (102 - 142)  BP: 120/76 (26 Feb 2024 11:53) (96/53 - 120/76)  BP(mean): --  RR: 22 (26 Feb 2024 11:53) (18 - 34)  SpO2: 98% (26 Feb 2024 11:53) (95% - 99%)    Parameters below as of 26 Feb 2024 11:53  Patient On (Oxygen Delivery Method): room air      Daily     Daily     GENERAL:  Appears stated age,   HEENT:  NC/AT,    CHEST:  Full & symmetric excursion,   HEART:  Regular rhythm  ABDOMEN:  Soft, non-tender, non-distended,   EXTEREMITIES:  no cyanosis,clubbing or edema  SKIN:  No rash  NEURO:  Alert,    LABS:                        9.5    5.68  )-----------( 204      ( 26 Feb 2024 08:35 )             29.2     02-26    148<H>  |  120<H>  |  3<L>  ----------------------------<  83  3.8   |  24  |  0.45<L>    Ca    8.0<L>      26 Feb 2024 08:35  Mg     2.1     02-26    TPro  5.7<L>  /  Alb  2.5<L>  /  TBili  0.4  /  DBili  x   /  AST  29  /  ALT  56  /  AlkPhos  49  02-26    LIVER FUNCTIONS - ( 26 Feb 2024 08:35 )  Alb: 2.5 g/dL / Pro: 5.7 g/dL / ALK PHOS: 49 U/L / ALT: 56 U/L / AST: 29 U/L / GGT: x           PT/INR - ( 25 Feb 2024 17:20 )   PT: 12.9 sec;   INR: 1.11 ratio         PTT - ( 25 Feb 2024 17:20 )  PTT:29.5 sec  Urinalysis Basic - ( 26 Feb 2024 08:35 )    Color: x / Appearance: x / SG: x / pH: x  Gluc: 83 mg/dL / Ketone: x  / Bili: x / Urobili: x   Blood: x / Protein: x / Nitrite: x   Leuk Esterase: x / RBC: x / WBC x   Sq Epi: x / Non Sq Epi: x / Bacteria: x          Imaging:

## 2024-02-27 ENCOUNTER — TRANSCRIPTION ENCOUNTER (OUTPATIENT)
Age: 19
End: 2024-02-27

## 2024-02-27 VITALS
OXYGEN SATURATION: 98 % | TEMPERATURE: 98 F | DIASTOLIC BLOOD PRESSURE: 71 MMHG | HEART RATE: 100 BPM | SYSTOLIC BLOOD PRESSURE: 103 MMHG | RESPIRATION RATE: 21 BRPM

## 2024-02-27 LAB
ANION GAP SERPL CALC-SCNC: 4 MMOL/L — LOW (ref 5–17)
BUN SERPL-MCNC: 3 MG/DL — LOW (ref 7–23)
CALCIUM SERPL-MCNC: 8.6 MG/DL — SIGNIFICANT CHANGE UP (ref 8.5–10.1)
CHLORIDE SERPL-SCNC: 112 MMOL/L — HIGH (ref 96–108)
CO2 SERPL-SCNC: 28 MMOL/L — SIGNIFICANT CHANGE UP (ref 22–31)
CREAT SERPL-MCNC: 0.47 MG/DL — LOW (ref 0.5–1.3)
EGFR: 140 ML/MIN/1.73M2 — SIGNIFICANT CHANGE UP
GI PCR PANEL: DETECTED
GLUCOSE SERPL-MCNC: 82 MG/DL — SIGNIFICANT CHANGE UP (ref 70–99)
MAGNESIUM SERPL-MCNC: 1.9 MG/DL — SIGNIFICANT CHANGE UP (ref 1.6–2.6)
NOROVIRUS GI+II RNA STL QL NAA+NON-PROBE: DETECTED
PHOSPHATE SERPL-MCNC: 2.8 MG/DL — SIGNIFICANT CHANGE UP (ref 2.5–4.5)
POTASSIUM SERPL-MCNC: 3.8 MMOL/L — SIGNIFICANT CHANGE UP (ref 3.5–5.3)
POTASSIUM SERPL-SCNC: 3.8 MMOL/L — SIGNIFICANT CHANGE UP (ref 3.5–5.3)
SODIUM SERPL-SCNC: 144 MMOL/L — SIGNIFICANT CHANGE UP (ref 135–145)

## 2024-02-27 PROCEDURE — 85610 PROTHROMBIN TIME: CPT

## 2024-02-27 PROCEDURE — 96375 TX/PRO/DX INJ NEW DRUG ADDON: CPT

## 2024-02-27 PROCEDURE — 83735 ASSAY OF MAGNESIUM: CPT

## 2024-02-27 PROCEDURE — 0225U NFCT DS DNA&RNA 21 SARSCOV2: CPT

## 2024-02-27 PROCEDURE — 36415 COLL VENOUS BLD VENIPUNCTURE: CPT

## 2024-02-27 PROCEDURE — 87086 URINE CULTURE/COLONY COUNT: CPT

## 2024-02-27 PROCEDURE — 80048 BASIC METABOLIC PNL TOTAL CA: CPT

## 2024-02-27 PROCEDURE — 82962 GLUCOSE BLOOD TEST: CPT

## 2024-02-27 PROCEDURE — 82272 OCCULT BLD FECES 1-3 TESTS: CPT

## 2024-02-27 PROCEDURE — 85379 FIBRIN DEGRADATION QUANT: CPT

## 2024-02-27 PROCEDURE — 84132 ASSAY OF SERUM POTASSIUM: CPT

## 2024-02-27 PROCEDURE — 85018 HEMOGLOBIN: CPT

## 2024-02-27 PROCEDURE — 70450 CT HEAD/BRAIN W/O DYE: CPT | Mod: MC

## 2024-02-27 PROCEDURE — 93970 EXTREMITY STUDY: CPT

## 2024-02-27 PROCEDURE — 80053 COMPREHEN METABOLIC PANEL: CPT

## 2024-02-27 PROCEDURE — 93005 ELECTROCARDIOGRAM TRACING: CPT

## 2024-02-27 PROCEDURE — 86901 BLOOD TYPING SEROLOGIC RH(D): CPT

## 2024-02-27 PROCEDURE — 84443 ASSAY THYROID STIM HORMONE: CPT

## 2024-02-27 PROCEDURE — 76830 TRANSVAGINAL US NON-OB: CPT

## 2024-02-27 PROCEDURE — 96376 TX/PRO/DX INJ SAME DRUG ADON: CPT

## 2024-02-27 PROCEDURE — 85014 HEMATOCRIT: CPT

## 2024-02-27 PROCEDURE — 85025 COMPLETE CBC W/AUTO DIFF WBC: CPT

## 2024-02-27 PROCEDURE — 84100 ASSAY OF PHOSPHORUS: CPT

## 2024-02-27 PROCEDURE — 86850 RBC ANTIBODY SCREEN: CPT

## 2024-02-27 PROCEDURE — 81001 URINALYSIS AUTO W/SCOPE: CPT

## 2024-02-27 PROCEDURE — 86900 BLOOD TYPING SEROLOGIC ABO: CPT

## 2024-02-27 PROCEDURE — 87507 IADNA-DNA/RNA PROBE TQ 12-25: CPT

## 2024-02-27 PROCEDURE — 99239 HOSP IP/OBS DSCHRG MGMT >30: CPT

## 2024-02-27 PROCEDURE — 80307 DRUG TEST PRSMV CHEM ANLYZR: CPT

## 2024-02-27 PROCEDURE — 96374 THER/PROPH/DIAG INJ IV PUSH: CPT

## 2024-02-27 PROCEDURE — 74177 CT ABD & PELVIS W/CONTRAST: CPT | Mod: MC

## 2024-02-27 PROCEDURE — 85730 THROMBOPLASTIN TIME PARTIAL: CPT

## 2024-02-27 PROCEDURE — 84702 CHORIONIC GONADOTROPIN TEST: CPT

## 2024-02-27 PROCEDURE — 99285 EMERGENCY DEPT VISIT HI MDM: CPT | Mod: 25

## 2024-02-27 RX ORDER — NORETHINDRONE AND ETHINYL ESTRADIOL 0.4-0.035
1 KIT ORAL
Refills: 0 | DISCHARGE

## 2024-02-27 RX ORDER — ACETAMINOPHEN 500 MG
2 TABLET ORAL
Qty: 0 | Refills: 0 | DISCHARGE
Start: 2024-02-27

## 2024-02-27 RX ORDER — METHYLPHENIDATE HCL 5 MG
1 TABLET ORAL
Refills: 0 | DISCHARGE

## 2024-02-27 RX ADMIN — Medication 650 MILLIGRAM(S): at 09:15

## 2024-02-27 RX ADMIN — Medication 650 MILLIGRAM(S): at 10:00

## 2024-02-27 NOTE — DISCHARGE NOTE NURSING/CASE MANAGEMENT/SOCIAL WORK - NSDCPEFALRISK_GEN_ALL_CORE
For information on Fall & Injury Prevention, visit: [email or web address] OR  [email or web address] OR  https://www.cdc.gov/steadi/patient.html

## 2024-02-27 NOTE — CASE MANAGEMENT PROGRESS NOTE - NSCMPROGRESSNOTE_GEN_ALL_CORE
Per MD pt is stable for transition home today. Discharge notice reviewed, copy given to patient. Pt is in agreement with transitioning home today. She stated that her boyfriend will be transporting her home.

## 2024-02-27 NOTE — PROGRESS NOTE ADULT - SUBJECTIVE AND OBJECTIVE BOX
Chief Complaint: Abdominal pain, nausea, vomiting    Interval Events: No events overnight.    Review of Systems:  General: No fevers, chills, weight gain  Skin: No rashes, color changes  Cardiovascular: No chest pain, orthopnea  Respiratory: No shortness of breath, cough  Gastrointestinal: No nausea, abdominal pain  Genitourinary: No incontinence, pain with urination  Musculoskeletal: No pain, swelling, decreased range of motion  Neurological: No headache, weakness  Psychiatric: No depression, anxiety  Endocrine: No weight gain, increased thirst  All other systems are comprehensively negative.    Physical Exam:  Vitals:        Vital Signs Last 24 Hrs  T(C): 36.6 (27 Feb 2024 04:06), Max: 36.7 (26 Feb 2024 20:12)  T(F): 97.9 (27 Feb 2024 04:06), Max: 98 (26 Feb 2024 20:12)  HR: 100 (27 Feb 2024 04:06) (100 - 108)  BP: 103/71 (27 Feb 2024 04:06) (103/71 - 120/76)  BP(mean): --  RR: 21 (27 Feb 2024 04:06) (21 - 22)  SpO2: 98% (27 Feb 2024 04:06) (95% - 98%)  Parameters below as of 27 Feb 2024 04:06  Patient On (Oxygen Delivery Method): room air  General: NAD  HEENT: MMM  Neck: No JVD, no carotid bruit  Lungs: CTAB  CV: RRR, nl S1/S2, no M/R/G  Abdomen: S/NT/ND, +BS  Extremities: No LE edema, no cyanosis  Neuro: AAOx3, non-focal  Skin: No rash    Labs:                        9.5    5.68  )-----------( 204      ( 26 Feb 2024 08:35 )             29.2     02-27    144  |  112<H>  |  3<L>  ----------------------------<  82  3.8   |  28  |  0.47<L>    Ca    8.6      27 Feb 2024 06:45  Phos  2.8     02-27  Mg     1.9     02-27    TPro  5.7<L>  /  Alb  2.5<L>  /  TBili  0.4  /  DBili  x   /  AST  29  /  ALT  56  /  AlkPhos  49  02-26        PT/INR - ( 25 Feb 2024 17:20 )   PT: 12.9 sec;   INR: 1.11 ratio         PTT - ( 25 Feb 2024 17:20 )  PTT:29.5 sec    ECG/Telemetry: Sinus tachycardia
[facility]  Gorge Funk PA-C  [address] [place], [place] [id number] [phone]      INTERVAL HPI/OVERNIGHT EVENTS:  Pt s/e  Norovirus positive  Pt feels improved today    MEDICATIONS  (STANDING):  Jie Fe 1.5 mg/30 mcg tablet 1 Tablet(s) 1 Tablet(s) Oral every 24 hours  lactated ringers. 1000 milliLiter(s) (75 mL/Hr) IV Continuous <Continuous>  pantoprazole  Injectable 40 milliGRAM(s) IV Push daily    MEDICATIONS  (PRN):  acetaminophen     Tablet .. 650 milliGRAM(s) Oral every 6 hours PRN Temp greater or equal to 38C (100.4F), Mild Pain (1 - 3)  ondansetron Injectable 4 milliGRAM(s) IV Push every 8 hours PRN Nausea and/or Vomiting      Allergies    No Known Drug Allergies  latex (Rash)      PHYSICAL EXAM:   Vital Signs:  Vital Signs Last 24 Hrs  T(C): 36.6 (27 Feb 2024 04:06), Max: 36.7 (26 Feb 2024 20:12)  T(F): 97.9 (27 Feb 2024 04:06), Max: 98 (26 Feb 2024 20:12)  HR: 100 (27 Feb 2024 04:06) (100 - 103)  BP: 103/71 (27 Feb 2024 04:06) (103/71 - 113/73)  BP(mean): --  RR: 21 (27 Feb 2024 04:06) (21 - 21)  SpO2: 98% (27 Feb 2024 04:06) (96% - 98%)    Parameters below as of 27 Feb 2024 04:06  Patient On (Oxygen Delivery Method): room air    GENERAL:  Appears stated age  HEENT:  NC/AT  CHEST:  Full & symmetric excursion  HEART:  Regular rhythm  ABDOMEN:  Soft, non-tender, non-distended  EXTEREMITIES:  no cyanosis  SKIN:  No rash  NEURO:  Alert      LABS:                        9.5    5.68  )-----------( 204      ( 26 Feb 2024 08:35 )             29.2     02-27    144  |  112<H>  |  3<L>  ----------------------------<  82  3.8   |  28  |  0.47<L>    Ca    8.6      27 Feb 2024 06:45  Phos  2.8     02-27  Mg     1.9     02-27    TPro  5.7<L>  /  Alb  2.5<L>  /  TBili  0.4  /  DBili  x   /  AST  29  /  ALT  56  /  AlkPhos  49  02-26    PT/INR - ( 25 Feb 2024 17:20 )   PT: 12.9 sec;   INR: 1.11 ratio         PTT - ( 25 Feb 2024 17:20 )  PTT:29.5 sec  Urinalysis Basic - ( 27 Feb 2024 06:45 )    Color: x / Appearance: x / SG: x / pH: x  Gluc: 82 mg/dL / Ketone: x  / Bili: x / Urobili: x   Blood: x / Protein: x / Nitrite: x   Leuk Esterase: x / RBC: x / WBC x   Sq Epi: x / Non Sq Epi: x / Bacteria: x  
Renato Soto M.D.    Patient is a 18y old  Female who presents with a chief complaint of Anemia (26 Feb 2024 11:29)      SUBJECTIVE / OVERNIGHT EVENTS: no concerns.     Patient denies chest pain, SOB, abd pain, N/V, fever, chills, dysuria or any other complaints. All remainder ROS negative.     MEDICATIONS  (STANDING):  lactated ringers. 1000 milliLiter(s) (75 mL/Hr) IV Continuous <Continuous>  pantoprazole  Injectable 40 milliGRAM(s) IV Push daily    MEDICATIONS  (PRN):  acetaminophen     Tablet .. 650 milliGRAM(s) Oral every 6 hours PRN Temp greater or equal to 38C (100.4F), Mild Pain (1 - 3)  ondansetron Injectable 4 milliGRAM(s) IV Push every 8 hours PRN Nausea and/or Vomiting      I&O's Summary      PHYSICAL EXAM:  Vital Signs Last 24 Hrs  T(C): 36.5 (26 Feb 2024 09:47), Max: 37.1 (25 Feb 2024 15:48)  T(F): 97.7 (26 Feb 2024 09:47), Max: 98.8 (25 Feb 2024 15:48)  HR: 108 (26 Feb 2024 09:47) (102 - 142)  BP: 113/78 (26 Feb 2024 09:47) (96/53 - 113/78)  BP(mean): --  RR: 22 (26 Feb 2024 09:47) (18 - 34)  SpO2: 95% (26 Feb 2024 09:47) (95% - 99%)    Parameters below as of 26 Feb 2024 09:47  Patient On (Oxygen Delivery Method): room air    CONSTITUTIONAL: NAD, well-groomed  ENMT: Moist oral mucosa, no pharyngeal injection or exudates; normal dentition  RESPIRATORY: Normal respiratory effort; lungs are clear to auscultation bilaterally  CARDIOVASCULAR: Regular rate and rhythm, normal S1 and S2, no murmur/rub/gallop; No lower extremity edema; Peripheral pulses are 2+ bilaterally  ABDOMEN: Nontender to palpation, normoactive bowel sounds, no rebound/guarding; No hepatosplenomegaly  MUSCLOSKELETAL:  Normal gait; no clubbing or cyanosis of digits; no joint swelling or tenderness to palpation  PSYCH: A+O x3; affect appropriate  NEUROLOGY: CN 2-12 are intact and symmetric; no gross sensory deficits;   SKIN: No rashes; no palpable lesions    LABS:                        9.5    5.68  )-----------( 204      ( 26 Feb 2024 08:35 )             29.2     02-26    148<H>  |  120<H>  |  3<L>  ----------------------------<  83  3.8   |  24  |  0.45<L>    Ca    8.0<L>      26 Feb 2024 08:35  Mg     2.1     02-26    TPro  5.7<L>  /  Alb  2.5<L>  /  TBili  0.4  /  DBili  x   /  AST  29  /  ALT  56  /  AlkPhos  49  02-26    PT/INR - ( 25 Feb 2024 17:20 )   PT: 12.9 sec;   INR: 1.11 ratio         PTT - ( 25 Feb 2024 17:20 )  PTT:29.5 sec      Urinalysis Basic - ( 26 Feb 2024 08:35 )    Color: x / Appearance: x / SG: x / pH: x  Gluc: 83 mg/dL / Ketone: x  / Bili: x / Urobili: x   Blood: x / Protein: x / Nitrite: x   Leuk Esterase: x / RBC: x / WBC x   Sq Epi: x / Non Sq Epi: x / Bacteria: x        CAPILLARY BLOOD GLUCOSE          RADIOLOGY & ADDITIONAL TESTS:  Results Reviewed:   Imaging Personally Reviewed:  Electrocardiogram Personally Reviewed:

## 2024-02-27 NOTE — DISCHARGE NOTE NURSING/CASE MANAGEMENT/SOCIAL WORK - PATIENT PORTAL LINK FT
You can access the FollowMyHealth Patient Portal offered by [facility] by registering at the following website: [email or web address]. By joining [facility]’s FollowMyHealth portal, you will also be able to view your health information using other applications (apps) compatible with our system.

## 2024-02-27 NOTE — PROGRESS NOTE ADULT - ASSESSMENT
abdominal pain  nausea/vomiting  diarrhea    cont reg diet  suspected acute gastroenteritis  +norovirus  observe off antibiotics  outpatient anemia workup  d/w patient  d/c planning    I reviewed the overnight course of events on the unit, re-confirming the patient history. I discussed the care with the patient  Differential diagnosis and plan of care discussed with patient after the evaluation  35 minutes spent on total encounter of which more than fifty percent of the encounter was spent counseling and/or coordinating care by the attending physician.

## 2024-02-27 NOTE — PROGRESS NOTE ADULT - ASSESSMENT
The patient is a 18 year old female with a history of ADHD who presents with abdominal pain.    Plan:  - ECG with sinus tachycardia  - Telemetry reviewed; there is sinus tachycardia but no evidence of other tachyarrhythmias  - HR had improved to low 100s  - Tachycardia likely multifactorial from pain, dehydration, anemia, anxiety  - Monitor hemoglobin - in part may be dilutional  - Abdominal imaging unrevealing  - Norovirus positive which likely explains underlying symptoms  - No further cardiac testing indicated at this time

## 2024-08-15 ENCOUNTER — APPOINTMENT (OUTPATIENT)
Dept: ORTHOPEDIC SURGERY | Facility: CLINIC | Age: 19
End: 2024-08-15
Payer: MEDICAID

## 2024-08-15 VITALS — BODY MASS INDEX: 28 KG/M2 | HEIGHT: 63 IN | WEIGHT: 158 LBS

## 2024-08-15 DIAGNOSIS — S60.211A CONTUSION OF RIGHT WRIST, INITIAL ENCOUNTER: ICD-10-CM

## 2024-08-15 DIAGNOSIS — Z86.59 PERSONAL HISTORY OF OTHER MENTAL AND BEHAVIORAL DISORDERS: ICD-10-CM

## 2024-08-15 DIAGNOSIS — S50.01XA CONTUSION OF RIGHT ELBOW, INITIAL ENCOUNTER: ICD-10-CM

## 2024-08-15 DIAGNOSIS — S60.221A CONTUSION OF RIGHT HAND, INITIAL ENCOUNTER: ICD-10-CM

## 2024-08-15 PROCEDURE — 99204 OFFICE O/P NEW MOD 45 MIN: CPT

## 2024-08-15 PROCEDURE — 73110 X-RAY EXAM OF WRIST: CPT | Mod: RT

## 2024-08-15 RX ORDER — METHYLPHENIDATE HYDROCHLORIDE 5 MG/1
TABLET ORAL
Refills: 0 | Status: ACTIVE | COMMUNITY

## 2024-08-15 NOTE — PHYSICAL EXAM
[Pain with Flexion] : pain with flexion [Pain with Extension] : pain with extension [Right] : right hand [Dorsal Wrist] : dorsal wrist [1st] : 1st [MCP Joint] : MCP joint [] : no erythema

## 2024-08-15 NOTE — HISTORY OF PRESENT ILLNESS
[de-identified] : 8/15/24: Patient states she was carrying laundry down the stairs on 8/11/24 when she fell down and injured her right wrist. She was seen at , had x-rays done, and was advised no fractures. She was treated in a splint. She continues to have pain along with numbness into her hand and fingers. She has been taking motrin with some relief. No previous hand or wrist issues.  [FreeTextEntry1] : RT hand/wrist [FreeTextEntry5] : RT hand/wrist pain from falling a few days ago.

## 2024-08-15 NOTE — ASSESSMENT
[FreeTextEntry1] : x-rays reviewed. All negative.  Continue with motrin as needed. If symptoms persist may need further diagnostic imaging.
